# Patient Record
Sex: FEMALE | Race: OTHER | URBAN - METROPOLITAN AREA
[De-identification: names, ages, dates, MRNs, and addresses within clinical notes are randomized per-mention and may not be internally consistent; named-entity substitution may affect disease eponyms.]

---

## 2017-07-31 ENCOUNTER — EMERGENCY (EMERGENCY)
Facility: HOSPITAL | Age: 42
LOS: 1 days | Discharge: HOME | End: 2017-07-31
Attending: EMERGENCY MEDICINE | Admitting: EMERGENCY MEDICINE
Payer: COMMERCIAL

## 2017-07-31 VITALS
RESPIRATION RATE: 14 BRPM | SYSTOLIC BLOOD PRESSURE: 163 MMHG | OXYGEN SATURATION: 100 % | HEART RATE: 90 BPM | DIASTOLIC BLOOD PRESSURE: 110 MMHG

## 2017-07-31 VITALS
OXYGEN SATURATION: 100 % | RESPIRATION RATE: 16 BRPM | TEMPERATURE: 98 F | DIASTOLIC BLOOD PRESSURE: 106 MMHG | HEART RATE: 111 BPM | SYSTOLIC BLOOD PRESSURE: 159 MMHG

## 2017-07-31 DIAGNOSIS — R20.2 PARESTHESIA OF SKIN: ICD-10-CM

## 2017-07-31 LAB
ALBUMIN SERPL ELPH-MCNC: 4.6 G/DL — SIGNIFICANT CHANGE UP (ref 3.3–5)
ALP SERPL-CCNC: 95 U/L — SIGNIFICANT CHANGE UP (ref 40–120)
ALT FLD-CCNC: 43 U/L — HIGH (ref 4–33)
APTT BLD: 27.3 SEC — LOW (ref 27.5–37.4)
AST SERPL-CCNC: 37 U/L — HIGH (ref 4–32)
BASE EXCESS BLDV CALC-SCNC: 0.7 MMOL/L — SIGNIFICANT CHANGE UP
BASOPHILS # BLD AUTO: 0.04 K/UL — SIGNIFICANT CHANGE UP (ref 0–0.2)
BASOPHILS NFR BLD AUTO: 0.4 % — SIGNIFICANT CHANGE UP (ref 0–2)
BILIRUB SERPL-MCNC: 0.3 MG/DL — SIGNIFICANT CHANGE UP (ref 0.2–1.2)
BLOOD GAS VENOUS - CREATININE: 0.64 MG/DL — SIGNIFICANT CHANGE UP (ref 0.5–1.3)
BUN SERPL-MCNC: 9 MG/DL — SIGNIFICANT CHANGE UP (ref 7–23)
CALCIUM SERPL-MCNC: 9.6 MG/DL — SIGNIFICANT CHANGE UP (ref 8.4–10.5)
CHLORIDE BLDV-SCNC: 106 MMOL/L — SIGNIFICANT CHANGE UP (ref 96–108)
CHLORIDE SERPL-SCNC: 103 MMOL/L — SIGNIFICANT CHANGE UP (ref 98–107)
CO2 SERPL-SCNC: 21 MMOL/L — LOW (ref 22–31)
CREAT SERPL-MCNC: 0.6 MG/DL — SIGNIFICANT CHANGE UP (ref 0.5–1.3)
EOSINOPHIL # BLD AUTO: 0.19 K/UL — SIGNIFICANT CHANGE UP (ref 0–0.5)
EOSINOPHIL NFR BLD AUTO: 1.9 % — SIGNIFICANT CHANGE UP (ref 0–6)
GAS PNL BLDV: 135 MMOL/L — LOW (ref 136–146)
GLUCOSE BLDV-MCNC: 104 — HIGH (ref 70–99)
GLUCOSE SERPL-MCNC: 106 MG/DL — HIGH (ref 70–99)
HCO3 BLDV-SCNC: 25 MMOL/L — SIGNIFICANT CHANGE UP (ref 20–27)
HCT VFR BLD CALC: 38.8 % — SIGNIFICANT CHANGE UP (ref 34.5–45)
HCT VFR BLDV CALC: 40.8 % — SIGNIFICANT CHANGE UP (ref 34.5–45)
HGB BLD-MCNC: 13.3 G/DL — SIGNIFICANT CHANGE UP (ref 11.5–15.5)
HGB BLDV-MCNC: 13.3 G/DL — SIGNIFICANT CHANGE UP (ref 11.5–15.5)
IMM GRANULOCYTES # BLD AUTO: 0.07 # — SIGNIFICANT CHANGE UP
IMM GRANULOCYTES NFR BLD AUTO: 0.7 % — SIGNIFICANT CHANGE UP (ref 0–1.5)
INR BLD: 0.97 — SIGNIFICANT CHANGE UP (ref 0.88–1.17)
LACTATE BLDV-MCNC: 3.5 MMOL/L — HIGH (ref 0.5–2)
LYMPHOCYTES # BLD AUTO: 2.57 K/UL — SIGNIFICANT CHANGE UP (ref 1–3.3)
LYMPHOCYTES # BLD AUTO: 26.4 % — SIGNIFICANT CHANGE UP (ref 13–44)
MCHC RBC-ENTMCNC: 29 PG — SIGNIFICANT CHANGE UP (ref 27–34)
MCHC RBC-ENTMCNC: 34.3 % — SIGNIFICANT CHANGE UP (ref 32–36)
MCV RBC AUTO: 84.7 FL — SIGNIFICANT CHANGE UP (ref 80–100)
MONOCYTES # BLD AUTO: 0.75 K/UL — SIGNIFICANT CHANGE UP (ref 0–0.9)
MONOCYTES NFR BLD AUTO: 7.7 % — SIGNIFICANT CHANGE UP (ref 2–14)
NEUTROPHILS # BLD AUTO: 6.13 K/UL — SIGNIFICANT CHANGE UP (ref 1.8–7.4)
NEUTROPHILS NFR BLD AUTO: 62.9 % — SIGNIFICANT CHANGE UP (ref 43–77)
NRBC # FLD: 0 — SIGNIFICANT CHANGE UP
PCO2 BLDV: 33 MMHG — LOW (ref 41–51)
PH BLDV: 7.48 PH — HIGH (ref 7.32–7.43)
PLATELET # BLD AUTO: 320 K/UL — SIGNIFICANT CHANGE UP (ref 150–400)
PMV BLD: 10.2 FL — SIGNIFICANT CHANGE UP (ref 7–13)
PO2 BLDV: 25 MMHG — LOW (ref 35–40)
POTASSIUM BLDV-SCNC: 3.8 MMOL/L — SIGNIFICANT CHANGE UP (ref 3.4–4.5)
POTASSIUM SERPL-MCNC: 4 MMOL/L — SIGNIFICANT CHANGE UP (ref 3.5–5.3)
POTASSIUM SERPL-SCNC: 4 MMOL/L — SIGNIFICANT CHANGE UP (ref 3.5–5.3)
PROT SERPL-MCNC: 7.2 G/DL — SIGNIFICANT CHANGE UP (ref 6–8.3)
PROTHROM AB SERPL-ACNC: 10.9 SEC — SIGNIFICANT CHANGE UP (ref 9.8–13.1)
RBC # BLD: 4.58 M/UL — SIGNIFICANT CHANGE UP (ref 3.8–5.2)
RBC # FLD: 12.8 % — SIGNIFICANT CHANGE UP (ref 10.3–14.5)
SAO2 % BLDV: 45.9 % — LOW (ref 60–85)
SODIUM SERPL-SCNC: 143 MMOL/L — SIGNIFICANT CHANGE UP (ref 135–145)
TSH SERPL-MCNC: 0.66 UIU/ML — SIGNIFICANT CHANGE UP (ref 0.27–4.2)
WBC # BLD: 9.75 K/UL — SIGNIFICANT CHANGE UP (ref 3.8–10.5)
WBC # FLD AUTO: 9.75 K/UL — SIGNIFICANT CHANGE UP (ref 3.8–10.5)

## 2017-07-31 PROCEDURE — 70450 CT HEAD/BRAIN W/O DYE: CPT | Mod: 26

## 2017-07-31 PROCEDURE — 99285 EMERGENCY DEPT VISIT HI MDM: CPT

## 2017-07-31 RX ORDER — SODIUM CHLORIDE 9 MG/ML
1000 INJECTION INTRAMUSCULAR; INTRAVENOUS; SUBCUTANEOUS ONCE
Refills: 0 | Status: COMPLETED | OUTPATIENT
Start: 2017-07-31 | End: 2017-07-31

## 2017-07-31 RX ADMIN — SODIUM CHLORIDE 1000 MILLILITER(S): 9 INJECTION INTRAMUSCULAR; INTRAVENOUS; SUBCUTANEOUS at 22:43

## 2017-07-31 NOTE — ED PROVIDER NOTE - PROGRESS NOTE DETAILS
43 y/o F p/w RUE weakness, sudden onset 1 hour ago. Pt also c/o generalized weakness, malaise. Pt c/o elevated BP over the last week. Pt denies numbness, vision changes, dizziness. FSG in triage wnl. Exam shows slightly decreased strength in RUE, sensation intact, no pronator drift. Stroke Code called.  Kevon Acosta MD PGY-4 Pt ambulates w/o difficulty. No nausea, vomiting.

## 2017-07-31 NOTE — ED ADULT TRIAGE NOTE - CHIEF COMPLAINT QUOTE
Pt arrives w/ c/o feeling lightheaded, dizzy and faint while eating dinner this evening. Pt rpts feeling as though it was difficult to move right arm to place food in mouth. Equal hand grasp noted. Rpts feeling tingling, numbness and weakness to right arm at present. Symptoms started 1 hr ago. LAURYN called for stroke eval and code stroke called.

## 2017-07-31 NOTE — ED ADULT NURSE NOTE - OBJECTIVE STATEMENT
javan RN : Pt. received in room 17 , A&Ox3 arrives as a code stroke. As per pt. and  at bedside pt. had an episode of fatigue ,right sided  weakness , bilateral arm numbness an hour and a half ago. Pt. states she was outdoors for about 3 hours prior to episode. Pt. upon arrival is able to move bilateral arms and legs. Pt. just states she feels sob and weak. Pt. is placed on 2 L of oxygen for comfort requested by pt. Pt. Vitals as noted, and in no acute distress. Pt. dwgb2ep on cardiac monitor , NS. javan RN : Pt. received in room 17 , A&Ox3 arrives as a code stroke. As per pt. and  at bedside pt. had an episode of fatigue ,right sided  weakness , bilateral arm numbness an hour and a half ago. Pt. states she was outdoors for about 3 hours prior to episode. Pt. upon arrival is able to move bilateral arms and legs. Pt. just states she feels sob and weak. Pt. is placed on 2 L of oxygen for comfort requested by pt. Pt. Vitals as noted, and in no acute distress. Pt. placed on cardiac monitor , NS.

## 2017-07-31 NOTE — CONSULT NOTE ADULT - ASSESSMENT
41 y/o woman w/ PMH right arm weakness/numbness from reported shoulder/neck injury p/w lightheadedness, shortness of breath, fatigue, with increased right arm weakness x 1 hour, improving with rest. Only focal abnormality on exam is baseline decreased sensation on right upper extremity with no focal weakness. CTH with no acute pathology. Given sequence of events, symptoms most likely 2/2 exacerbation of chronic right arm symptoms in the setting of dehydration/poor PO intake, unlikely to be ischemic event.

## 2017-07-31 NOTE — CONSULT NOTE ADULT - SUBJECTIVE AND OBJECTIVE BOX
Neurology Consult Note    Name  NAIDA FOSS    HPI: 41 y/o woman w/ PMH HLD and hypothyroidism p/w fatigue, dizziness, and right arm weakness x 1.5 hours. 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 Just as she sat down to eat dinner, she felt like she was going to black out, had a spinning sensation, and felt like her right arm (which she was holding her fork with) became very heavy. Also endorses stomach feeling tight and shortness of breath. Says she has been feeling very fatigued all day. Patient has a history of right arm numbness/paresthesia and weakness from a neck/shoulder injury at work but felt this was worse than baseline. After laying down on the stretcher in the ER, patient reported improving symptoms.    NIHSS 1 MRS 0     Review of Systems:  Constitutional: As above       Eyes, Ears, Mouth, Throat: Negative  Respiratory: As above                           Cardiovascular: As above  Gastrointestinal: As above                                    Genitourinary: Negative  Musculoskeletal: As above                                   Dermatologic:  Negative  Neurological: as per above                                                                 Psychiatric:  Negative  Endocrine:  Negative             Hematologic/Lymphatic:  Negative    PMH: As above  PSH: Breast implants in 2/2016, abdominoplasty  Meds: Levothyroxine 150 mcg daily  All: NKDA  SH: Social EtOH, denies cigarette/drug use  FH: NC      Objective:   Vital Signs Last 24 Hrs  T(C): 36.9 (31 Jul 2017 20:45), Max: 36.9 (31 Jul 2017 20:45)  T(F): 98.4 (31 Jul 2017 20:45), Max: 98.4 (31 Jul 2017 20:45)  HR: 111 (31 Jul 2017 20:45) (111 - 111)  BP: 159/106 (31 Jul 2017 20:45) (159/106 - 159/106)  BP(mean): --  RR: 16 (31 Jul 2017 20:45) (16 - 16)  SpO2: 100% (31 Jul 2017 20:45) (100% - 100%)    General Exam:   General appearance: No acute distress                 Cardiovascular: S1S2, RRR  Chest: CTA b/l  Abd: Soft, nontender  Ext: No edema    Neurological Exam:  Mental Status: AAOx3, speech fluent, follows complex commands    Cranial Nerves: PERRL, EOMI, VFF, CN V1-3 intact to light touch.  No facial asymmetry. Tongue, uvula and palate midline. No dysarthria.    Motor: No pronator drift b/l UEs, no drift of b/l LEs. B/l UEs against resistance: 5/5 elbow flexion and extension. Shoulder abduction 4+/5 b/l.    Sensation: Decreased to LT inferior portion of RUE (chronic), no extinction on double simultaneous stimulation    Coord: No dysmetria or ataxia on FTN b/l    Gait: Normal, non-ataxic    Other:    Labs pending    CTH:     IMPRESSION:     No acute intracranial hemorrhage or mass effect.

## 2017-07-31 NOTE — ED PROVIDER NOTE - OBJECTIVE STATEMENT
42F hx of hypothyroidism presents with weakness in her R arm. Pt endorsed to drinking 5 shots of hard liquor every other day, been out in the sun a lot for the past few days experienced weakness and nausea around 2 hours ago. Pt denies any slurred speech, change in vision, nausea vomiting fever or chills. Also denies any focal weakness, abnormal gait. No headache.

## 2017-07-31 NOTE — ED PROVIDER NOTE - ATTENDING CONTRIBUTION TO CARE
Attending note:   After face to face evaluation of this patient, I concur with above noted hx, pe, and care plan for this patient. +43 y/o F with chronic left arm dysesthesias undergoing PT with onset tonight of lightheadedness and increased dysesthesias after being in the sun for three hours.   No weakness or sensory loss on evaluation with neuro.   Evaluation in progress; TPA not indicated at this time.

## 2017-07-31 NOTE — CONSULT NOTE ADULT - PROBLEM SELECTOR RECOMMENDATION 9
- As symptoms rapidly returned to baseline with rest, patient is not a tPA candidate  - Hydration via IV or PO  - F/u labs   - No further neurologic workup

## 2019-02-28 ENCOUNTER — RESULT REVIEW (OUTPATIENT)
Age: 44
End: 2019-02-28

## 2021-09-27 ENCOUNTER — APPOINTMENT (OUTPATIENT)
Dept: OTOLARYNGOLOGY | Facility: CLINIC | Age: 46
End: 2021-09-27
Payer: COMMERCIAL

## 2021-09-27 VITALS
BODY MASS INDEX: 33.87 KG/M2 | TEMPERATURE: 97.7 F | SYSTOLIC BLOOD PRESSURE: 170 MMHG | HEIGHT: 59 IN | HEART RATE: 82 BPM | DIASTOLIC BLOOD PRESSURE: 98 MMHG | WEIGHT: 168 LBS | OXYGEN SATURATION: 96 %

## 2021-09-27 DIAGNOSIS — E03.9 HYPOTHYROIDISM, UNSPECIFIED: ICD-10-CM

## 2021-09-27 DIAGNOSIS — Z63.5 DISRUPTION OF FAMILY BY SEPARATION AND DIVORCE: ICD-10-CM

## 2021-09-27 DIAGNOSIS — Z82.3 FAMILY HISTORY OF STROKE: ICD-10-CM

## 2021-09-27 DIAGNOSIS — F17.200 NICOTINE DEPENDENCE, UNSPECIFIED, UNCOMPLICATED: ICD-10-CM

## 2021-09-27 DIAGNOSIS — J30.2 OTHER SEASONAL ALLERGIC RHINITIS: ICD-10-CM

## 2021-09-27 DIAGNOSIS — Z78.9 OTHER SPECIFIED HEALTH STATUS: ICD-10-CM

## 2021-09-27 PROBLEM — Z00.00 ENCOUNTER FOR PREVENTIVE HEALTH EXAMINATION: Status: ACTIVE | Noted: 2021-09-27

## 2021-09-27 PROCEDURE — 99244 OFF/OP CNSLTJ NEW/EST MOD 40: CPT | Mod: 25

## 2021-09-27 PROCEDURE — 31231 NASAL ENDOSCOPY DX: CPT

## 2021-09-27 RX ORDER — ERGOCALCIFEROL 1.25 MG/1
1.25 MG CAPSULE, LIQUID FILLED ORAL
Qty: 8 | Refills: 0 | Status: ACTIVE | COMMUNITY
Start: 2021-09-02

## 2021-09-27 SDOH — SOCIAL STABILITY - SOCIAL INSECURITY: DISRUPTION OF FAMILY BY SEPARATION AND DIVORCE: Z63.5

## 2021-09-27 NOTE — REVIEW OF SYSTEMS
[Sneezing] : sneezing [Seasonal Allergies] : seasonal allergies [Ear Itch] : ear itch [Recurrent Ear Infections] : recurrent ear infections [Ear Drainage] : ear drainage [Nasal Congestion] : nasal congestion [Recurrent Sinus Infections] : recurrent sinus infections [Sinus Pain] : sinus pain [Sinus Pressure] : sinus pressure [Throat Dryness] : throat dryness [Throat Itching] : throat itching [Chills] : chills [Feeling Tired] : feeling tired [Ear Pain] : ear pain [Dizziness] : dizziness [Vertigo] : vertigo [Lightheadedness] : lightheadedness [Negative] : Heme/Lymph [Nose Bleeds] : no nose bleeds

## 2021-09-27 NOTE — PHYSICAL EXAM
[Nasal Endoscopy Performed] : nasal endoscopy was performed, see procedure section for findings [de-identified] : Pale and [id number] [Midline] : trachea located in midline position [Normal] : no rashes

## 2021-09-27 NOTE — CONSULT LETTER
[Dear  ___] : Dear  [unfilled], [Consult Letter:] : I had the pleasure of evaluating your patient, [unfilled]. [Please see my note below.] : Please see my note below. [Consult Closing:] : Thank you very much for allowing me to participate in the care of this patient.  If you have any questions, please do not hesitate to contact me. [Sincerely,] : Sincerely, [FreeTextEntry2] : Loc Hansen MD ([place], [place])\par  [FreeTextEntry3] : Chad Hicks MD, FACS\par Chief of Otolaryngology [facility]\par  - Dept. of Otolaryngology\par [employer]\par \par

## 2021-09-27 NOTE — HISTORY OF PRESENT ILLNESS
[Facial Pain] : facial pain [Headache] : headache [Clear Rhinorrhea] : clear rhinorrhea [Facial Pressure] : facial pressure [Retro-Orbital Pain] : retro-orbital pain [Purulent Rhinorrhea] : purulent rhinorrhea [Nasal Congestion] : nasal congestion [Postnasal Drainage] : postnasal drainage [de-identified] : Ms. FOSS is a 46 year female who presents reporting that she constantly has sinus pressure, severe dryness and the sensation that fluid moves to her ears.  She also notes an itch on her eyes.  When she has the congestion, her eyes hurt with corresponding head head ache.  She reports that eventually the congestion travels down to her chest.  No imaging was done.  She reports going to the urgent centers when she is symptomatic of which she was given antibiotics with relief.

## 2021-09-27 NOTE — CONSULT LETTER
[Dear  ___] : Dear  [unfilled], [Consult Letter:] : I had the pleasure of evaluating your patient, [unfilled]. [Please see my note below.] : Please see my note below. [Consult Closing:] : Thank you very much for allowing me to participate in the care of this patient.  If you have any questions, please do not hesitate to contact me. [Sincerely,] : Sincerely, [FreeTextEntry2] : Loc Hansen MD ([place], [place])\par  [FreeTextEntry3] : Chad Hicks MD, FACS\par Chief of Otolaryngology [facility]\par  - Dept. of Otolaryngology\par [facility] [employer]\par \par

## 2021-09-27 NOTE — PHYSICAL EXAM
[Nasal Endoscopy Performed] : nasal endoscopy was performed, see procedure section for findings [de-identified] : Pale and [id number] [Midline] : trachea located in midline position [Normal] : no rashes

## 2021-09-27 NOTE — REASON FOR VISIT
[Initial Consultation] : an initial consultation for [FreeTextEntry2] : sinus pressure and congestion.

## 2021-09-27 NOTE — PROCEDURE
[FreeTextEntry6] : Procedure performed: Nasal Endoscopy- Diagnostic\par \par Pre-op indication(s): Chronic nasal inflammation\par Post-op indication(s): Same\par \par Verbal and/or written consent obtained from patient\par \par “Anterior rhinoscopy insufficient to account for symptoms”\par \par Details for procedure:\par Scope #: G1\par Type of scope: Flex\par \par Anesthesia: 4% Lidocaine and Afrin\par \par The following anatomic sites were directly examined in a sequential fashion:\par The scope was introduced in the nasal passage between the middle and inferior turbinates to exam the inferior portion of the middle meatus and the fontanelle, as well as the maxillary ostia.  Next, the scope was passed medially and posteriorly to the middle turbinates to examine the sphenoethmoid recess and the superior turbinate region.\par \par Upon visualization the findings are as follows:\par \par Nasal Septum:\par            Normal          \par             \par Right Side:\par ·	Mucosa: Edematous\par ·	Mucous: Normal\par ·	Polyp: No\par ·	Inferior Turbinate: Normal\par ·	Middle Turbinate: Normal\par ·	Superior Turbinate: Normal\par ·	Inferior Meatus: Normal\par ·	Middle Meatus: Normal\par ·	Superior Meatus: Normal\par ·	Sphenoethmoidal Recess: Normal\par \par Left Side:\par ·	Mucosa: Edematous\par ·	Mucous: Normal\par ·	Polyp: No\par ·	Inferior Turbinate: Normal\par ·	Middle Turbinate: Normal\par ·	Superior Turbinate: Normal\par ·	Inferior Meatus: Normal\par ·	Middle Meatus: Normal\par ·	Superior Meatus: Normal\par ·	Sphenoethmoidal Recess: Normal\par \par \par The patient tolerated the procedure well without any complications. \par \par

## 2021-09-27 NOTE — HISTORY OF PRESENT ILLNESS
[Facial Pain] : facial pain [Headache] : headache [Clear Rhinorrhea] : clear rhinorrhea [Facial Pressure] : facial pressure [Retro-Orbital Pain] : retro-orbital pain [Purulent Rhinorrhea] : purulent rhinorrhea [Nasal Congestion] : nasal congestion [Postnasal Drainage] : postnasal drainage [de-identified] : Ms. FOSS is a 46 year female who presents reporting that she constantly has sinus pressure, severe dryness and the sensation that fluid moves to her ears.  She also notes an itch on her eyes.  When she has the congestion, her eyes hurt with corresponding head head ache.  She reports that eventually the congestion travels down to her chest.  No imaging was done.  She reports going to the urgent centers when she is symptomatic of which she was given antibiotics with relief.

## 2021-10-13 ENCOUNTER — RESULT REVIEW (OUTPATIENT)
Age: 46
End: 2021-10-13

## 2022-04-25 ENCOUNTER — FORM ENCOUNTER (OUTPATIENT)
Age: 47
End: 2022-04-25

## 2022-04-25 ENCOUNTER — APPOINTMENT (OUTPATIENT)
Dept: ORTHOPEDIC SURGERY | Facility: CLINIC | Age: 47
End: 2022-04-25
Payer: OTHER MISCELLANEOUS

## 2022-04-25 VITALS — WEIGHT: 168 LBS | BODY MASS INDEX: 33.87 KG/M2 | HEIGHT: 59 IN

## 2022-04-25 DIAGNOSIS — Z78.9 OTHER SPECIFIED HEALTH STATUS: ICD-10-CM

## 2022-04-25 DIAGNOSIS — Z86.39 PERSONAL HISTORY OF OTHER ENDOCRINE, NUTRITIONAL AND METABOLIC DISEASE: ICD-10-CM

## 2022-04-25 PROCEDURE — 99214 OFFICE O/P EST MOD 30 MIN: CPT

## 2022-04-25 PROCEDURE — 99072 ADDL SUPL MATRL&STAF TM PHE: CPT

## 2022-04-25 NOTE — DISCUSSION/SUMMARY
[de-identified] : The patient was advised of the diagnosis.  The natural history of the pathology was explained in full to the patient in layman's terms.  Several different treatment options were discussed and explained to the patient including the risks and benefits of both surgical and non-surgical treatments.\par \par The risks, benefits, and alternatives to surgical arthroscopy of the left knee with partial medial meniscectomy and synovectomy were reviewed with the patient.  Specifically, I reviewed with the patient that any anterior knee pain may paradoxically worsen for the first six weeks following arthroscopy due to quadriceps weakness. Further, I reviewed with the patient that while arthroscopic treatment typically provides substantial relief of the symptoms (posteromedial or posterolateral joint line pain and mechanical symptoms) related to meniscus tears, arthroscopic treatments typically have very minimal relief of symptoms (anterior knee pain) related to chondromalacia patella or early osteoarthritis.  The risk of recurrent tears as well as progression of occult or underlying arthritis, avascular necrosis, and / or chondrolysis were discussed as well. The patient clearly communicated that these issues were understood.  \par \par We also discussed that the risks of surgery include but are not limited to pain, infection (superficial or deep), bleeding, vascular injury, numbness, tingling, nerve damage (direct or indirect), scarring, wound breakdown, failure to resolve symptoms, symptom recurrence, the need for further surgery as well as medical complications such as blood clots, pulmonary embolism, heart attack, stroke, and other anesthesia complications including even death.  The patient clearly communicated that these risks were understood and wished to proceed. This will be scheduled accordingly.\par

## 2022-04-25 NOTE — HISTORY OF PRESENT ILLNESS
[de-identified] : WC 1/17/22\par  \par 46 year old female   (RHD, ) left knee pain since slipped and fell in the parking lot at work on 1/17/22 and knee twisted. has pain that is medial anterior and deep and assoc with catching and buckling. has tried ice, nsaids, activity modficiaotn without relief.\par \par   3/31/22 - had CSI (2/14) , using brace, , got mri\par 4/25/22 - sent to PT but still hasn’t got to go, cont medial pain and catching\par

## 2022-04-25 NOTE — IMAGING
[de-identified] : \par LEFT KNEE\par Inspection:  mild effusion\par Palpation: medial joint line tenderness \par Knee Range of Motion:  0-130 \par Strength: 5/5 Quadriceps strength, 5/5 Hamstring strength\par Neurological: light touch is intact throughout\par Ligament Stability and Special Tests: \par McMurrays: Positive\par Lachman: neg\par Pivot Shift: neg\par Posterior Drawer: neg\par Valgus: neg\par Varus: neg\par Patella Apprehension: neg\par Patella Maltracking: neg\par

## 2022-04-25 NOTE — ASSESSMENT
[FreeTextEntry1] : mri left knee (action open mri) 3/4/22 - MMT (free edge), eff, synov\par \par since cont mechanical symptoms along with active lifestyle she is a surg candidate\par r/b/a L PMM, synov discussed at length but wants to try PT\par oow till may 13th then can reutrnn to work\par will fu 1 month and re-eval poss surgery\par

## 2022-04-25 NOTE — WORK
[Partial] : partial [Does not reveal pre-existing condition(s) that may affect treatment/prognosis] : does not reveal pre-existing condition(s) that may affect treatment/prognosis [Patient] : patient [I provided the services listed above] :  I provided the services listed above. [Can return to work without limitations on ______] : can return to work without limitations on [unfilled]

## 2022-05-23 ENCOUNTER — APPOINTMENT (OUTPATIENT)
Dept: ORTHOPEDIC SURGERY | Facility: CLINIC | Age: 47
End: 2022-05-23

## 2022-06-09 ENCOUNTER — APPOINTMENT (OUTPATIENT)
Dept: ORTHOPEDIC SURGERY | Facility: CLINIC | Age: 47
End: 2022-06-09

## 2022-06-16 ENCOUNTER — APPOINTMENT (OUTPATIENT)
Dept: ORTHOPEDIC SURGERY | Facility: CLINIC | Age: 47
End: 2022-06-16

## 2022-06-20 ENCOUNTER — APPOINTMENT (OUTPATIENT)
Dept: ORTHOPEDIC SURGERY | Facility: CLINIC | Age: 47
End: 2022-06-20
Payer: OTHER MISCELLANEOUS

## 2022-06-20 VITALS — HEIGHT: 58 IN | WEIGHT: 165 LBS | BODY MASS INDEX: 34.63 KG/M2

## 2022-06-20 PROCEDURE — 99215 OFFICE O/P EST HI 40 MIN: CPT

## 2022-06-20 PROCEDURE — 99072 ADDL SUPL MATRL&STAF TM PHE: CPT

## 2022-06-20 NOTE — DISCUSSION/SUMMARY
[de-identified] : The patient was advised of the diagnosis.  The natural history of the pathology was explained in full to the patient in layman's terms.  Several different treatment options were discussed and explained to the patient including the risks and benefits of both surgical and non-surgical treatments.\par \par The risks, benefits, and alternatives to surgical arthroscopy of the left knee with partial medial meniscectomy and synovectomy were reviewed with the patient.  Specifically, I reviewed with the patient that any anterior knee pain may paradoxically worsen for the first six weeks following arthroscopy due to quadriceps weakness. Further, I reviewed with the patient that while arthroscopic treatment typically provides substantial relief of the symptoms (posteromedial or posterolateral joint line pain and mechanical symptoms) related to meniscus tears, arthroscopic treatments typically have very minimal relief of symptoms (anterior knee pain) related to chondromalacia patella or early osteoarthritis.  The risk of recurrent tears as well as progression of occult or underlying arthritis, avascular necrosis, and / or chondrolysis were discussed as well. The patient clearly communicated that these issues were understood.  \par \par We discussed the additional risk and side effects associated with obesity - including but not limited to the negative impact on healing, infection rate, potential failure of surgical implants, and negative patient outcomes.  The patient was counseled on weight loss.\par \par We also discussed that the risks of surgery include but are not limited to pain, infection (superficial or deep), bleeding, vascular injury, numbness, tingling, nerve damage (direct or indirect), scarring, wound breakdown, failure to resolve symptoms, symptom recurrence, the need for further surgery as well as medical complications such as blood clots, pulmonary embolism, heart attack, stroke, and other anesthesia complications including even death.  The patient clearly communicated that these risks were understood.\par

## 2022-06-20 NOTE — IMAGING
[de-identified] : \par LEFT KNEE\par Inspection:  mild effusion\par Palpation: medial joint line tenderness \par Knee Range of Motion:  0-130 \par Strength: 5/5 Quadriceps strength, 5/5 Hamstring strength\par Neurological: light touch is intact throughout\par Ligament Stability and Special Tests: \par McMurrays: Positive\par Lachman: neg\par Pivot Shift: neg\par Posterior Drawer: neg\par Valgus: neg\par Varus: neg\par Patella Apprehension: neg\par Patella Maltracking: neg\par

## 2022-06-20 NOTE — ASSESSMENT
[FreeTextEntry1] : mri left knee (action open mri) 3/4/22 - MMT (free edge), eff, synov\par \par \par - The patient was advised of the diagnosis.  The natural history of the pathology was explained to the patient in layman's terms.  Several different treatment options were discussed and explained including the risks and benefits of both surgical and non-surgical treatments.  All questions and concerns were answered.\par - since cont mechanical symptoms despite conser treatment along with active lifestyle she is a surg candidate\par - r/b/a L PMM, synov discussed at length (incr risk due to bmi> 30), but she wants to hold off and try more PT\par - The patient was provided with a prescription for Physical Therapy.\par = naproyn rx\par - Patient was given a prescription for an anti-inflammatory medication.  They will take it for the next week and then on an as needed basis, as long as there are no medical contra-indications.  Patient is counseled on possible GI, renal, and cardiovascular side effects.\par - can only work light duty\par \par \par

## 2022-06-20 NOTE — HISTORY OF PRESENT ILLNESS
[de-identified] : WC 1/17/22\par  \par 46 year old female   (RHD, ) left knee pain since slipped and fell in the parking lot at work on 1/17/22 and knee twisted. has pain that is medial anterior and deep and assoc with catching and buckling. has tried ice, nsaids, activity modficiaotn without relief.\par \par   3/31/22 - had CSI (2/14) , using brace, , got mri\par 4/25/22 - sent to PT but still hasn’t got to go, cont medial pain and catching\par 6/20/22 - cont to have pain and mechanical symptoms with activty despite PT/HEP\par

## 2022-08-03 PROBLEM — M65.9 SYNOVITIS OF KNEE: Status: ACTIVE | Noted: 2022-05-20

## 2022-08-03 PROBLEM — S83.232A COMPLEX TEAR OF MEDIAL MENISCUS OF LEFT KNEE AS CURRENT INJURY, INITIAL ENCOUNTER: Status: ACTIVE | Noted: 2022-04-25

## 2022-08-04 ENCOUNTER — APPOINTMENT (OUTPATIENT)
Dept: ORTHOPEDIC SURGERY | Facility: CLINIC | Age: 47
End: 2022-08-04

## 2022-08-04 DIAGNOSIS — S83.232A COMPLEX TEAR OF MEDIAL MENISCUS, CURRENT INJURY, LEFT KNEE, INITIAL ENCOUNTER: ICD-10-CM

## 2022-08-04 DIAGNOSIS — M65.9 SYNOVITIS AND TENOSYNOVITIS, UNSPECIFIED: ICD-10-CM

## 2023-06-01 ENCOUNTER — APPOINTMENT (OUTPATIENT)
Dept: GASTROENTEROLOGY | Facility: CLINIC | Age: 48
End: 2023-06-01
Payer: COMMERCIAL

## 2023-06-01 VITALS
WEIGHT: 173 LBS | DIASTOLIC BLOOD PRESSURE: 111 MMHG | HEART RATE: 87 BPM | BODY MASS INDEX: 36.31 KG/M2 | HEIGHT: 58 IN | SYSTOLIC BLOOD PRESSURE: 150 MMHG

## 2023-06-01 DIAGNOSIS — Z86.018 PERSONAL HISTORY OF OTHER BENIGN NEOPLASM: ICD-10-CM

## 2023-06-01 DIAGNOSIS — Z80.0 FAMILY HISTORY OF MALIGNANT NEOPLASM OF DIGESTIVE ORGANS: ICD-10-CM

## 2023-06-01 DIAGNOSIS — T54.91XA TOXIC EFFECT OF UNSPECIFIED CORROSIVE SUBSTANCE, ACCIDENTAL (UNINTENTIONAL), INITIAL ENCOUNTER: ICD-10-CM

## 2023-06-01 PROCEDURE — 99204 OFFICE O/P NEW MOD 45 MIN: CPT

## 2023-06-01 RX ORDER — NAPROXEN 500 MG/1
500 TABLET ORAL TWICE DAILY
Qty: 60 | Refills: 0 | Status: DISCONTINUED | COMMUNITY
Start: 2022-06-20 | End: 2023-06-01

## 2023-06-02 PROBLEM — T54.91XA INGESTION OF CAUSTIC SUBSTANCE: Status: RESOLVED | Noted: 2023-06-02 | Resolved: 2023-06-02

## 2023-06-02 PROBLEM — Z80.0 FAMILY HISTORY OF MALIGNANT NEOPLASM OF COLON: Status: ACTIVE | Noted: 2021-09-27

## 2023-06-02 RX ORDER — LEVOTHYROXINE SODIUM 112 UG/1
112 TABLET ORAL
Refills: 0 | Status: ACTIVE | COMMUNITY
Start: 2021-08-21

## 2023-06-02 RX ORDER — POLYETHYLENE GLYCOL 3350 AND ELECTROLYTES WITH LEMON FLAVOR 236; 22.74; 6.74; 5.86; 2.97 G/4L; G/4L; G/4L; G/4L; G/4L
236 POWDER, FOR SOLUTION ORAL
Qty: 1 | Refills: 0 | Status: ACTIVE | COMMUNITY
Start: 2023-06-02 | End: 1900-01-01

## 2023-06-02 NOTE — REASON FOR VISIT
[Consultation] : a consultation visit [FreeTextEntry1] : colon cancer screening, chronic constipation

## 2023-06-02 NOTE — ASSESSMENT
[FreeTextEntry1] : 1.  Change in bowel habits with constipation, thin caliber stool, incomplete evacuation.  May be due to thyroid disorder, IBS-C.  Rule out obstructive lesions.\par 2.  Right-sided abdominal pain with radiation to back.  May be due to radiculopathy, constipation, fibroids.\par 3.  Family history of colon cancer (maternal uncle).  Prior colonoscopy 8 years ago.\par 4.  History of caustic ingestion with nausea/ vomiting, burning pain.  Rule out stricture or pre malignant lesions.\par 5.  Hypothyroidism.\par \par Recs:\par - Recent labs reviewed on phone.\par - The patient was advised to increase fluid and dietary fiber intake.  In addition, the patient was advised to use a fiber supplement daily.  She was given names of OTC products to try for constipation.\par - Samples of Linzess 72 mcg and 145 mcg given today.\par - Patient was advised to undergo colonoscopy and endoscopy- procedure, risks, benefits, and alternatives were explained. Patient agreeable. Brochure given. PEG prep with extra Dulcolax.\par - Letter given for work today.

## 2023-06-02 NOTE — HISTORY OF PRESENT ILLNESS
[FreeTextEntry1] : Adriana presents to the office today for evaluation for colon cancer screening and chronic constipation.\par \par The patient reports intermittent, chronic constipation for the past 2 years.  Prior to that, she reports regular daily BMs.  She reports that her bowel habits changed after she used an antibiotic for a dental infection.  The constipation also worsened after she had COVID.  She may move her bowels daily but the stool can be thin in caliber or very hard.  She may see red blood when she wipes herself after a hard stool.  Other times, she can go a few days without a BM.  She also does not feel completely evacuated after a BM.  She feels right-sided abdominal pain which radiates to her back and rectum.  The pain improves when she moves her bowels adequately or when she urinates. She has had sciatica in the past but feels her current pain is due to constipation and not her back.  She works as a  at night so her change in bowel habits is affecting her life.  The patient has tried Miralax but reports that it did not have any effect.\par \par Her mother had colon polyps and diverticulitis and her maternal uncle had colon cancer.  The patient had a colonoscopy about 8 years ago which was unremarkable.\par \par Of note, the patient was also advised by her PCP to undergo an EGD for a caustic ingestion several years ago.  While she was at work, she drank from a gingerale bottle that had been filled with a cleaning solution.  At the time, she had burning in her throat and chest with nausea and vomiting.  She was seen in the ER at that time but never had an EGD performed.  She currently denies any dysphagia or nausea.

## 2023-07-05 ENCOUNTER — EMERGENCY (EMERGENCY)
Facility: HOSPITAL | Age: 48
LOS: 1 days | Discharge: HOME | End: 2023-07-05
Attending: STUDENT IN AN ORGANIZED HEALTH CARE EDUCATION/TRAINING PROGRAM | Admitting: STUDENT IN AN ORGANIZED HEALTH CARE EDUCATION/TRAINING PROGRAM
Payer: COMMERCIAL

## 2023-07-05 VITALS
HEART RATE: 110 BPM | RESPIRATION RATE: 18 BRPM | TEMPERATURE: 99 F | SYSTOLIC BLOOD PRESSURE: 201 MMHG | OXYGEN SATURATION: 100 % | DIASTOLIC BLOOD PRESSURE: 115 MMHG

## 2023-07-05 LAB
ALBUMIN SERPL ELPH-MCNC: 4.5 G/DL — SIGNIFICANT CHANGE UP (ref 3.3–5)
ALP SERPL-CCNC: 98 U/L — SIGNIFICANT CHANGE UP (ref 40–120)
ALT FLD-CCNC: 45 U/L — HIGH (ref 4–33)
ANION GAP SERPL CALC-SCNC: 17 MMOL/L — HIGH (ref 7–14)
AST SERPL-CCNC: 37 U/L — HIGH (ref 4–32)
BASE EXCESS BLDV CALC-SCNC: -2.2 MMOL/L — LOW (ref -2–3)
BASOPHILS # BLD AUTO: 0.05 K/UL — SIGNIFICANT CHANGE UP (ref 0–0.2)
BASOPHILS NFR BLD AUTO: 0.5 % — SIGNIFICANT CHANGE UP (ref 0–2)
BILIRUB SERPL-MCNC: 0.5 MG/DL — SIGNIFICANT CHANGE UP (ref 0.2–1.2)
BLOOD GAS VENOUS COMPREHENSIVE RESULT: SIGNIFICANT CHANGE UP
BUN SERPL-MCNC: 7 MG/DL — SIGNIFICANT CHANGE UP (ref 7–23)
CALCIUM SERPL-MCNC: 9.6 MG/DL — SIGNIFICANT CHANGE UP (ref 8.4–10.5)
CHLORIDE BLDV-SCNC: 106 MMOL/L — SIGNIFICANT CHANGE UP (ref 96–108)
CHLORIDE SERPL-SCNC: 106 MMOL/L — SIGNIFICANT CHANGE UP (ref 98–107)
CO2 BLDV-SCNC: 25.1 MMOL/L — SIGNIFICANT CHANGE UP (ref 22–26)
CO2 SERPL-SCNC: 19 MMOL/L — LOW (ref 22–31)
CREAT SERPL-MCNC: 0.58 MG/DL — SIGNIFICANT CHANGE UP (ref 0.5–1.3)
EGFR: 110 ML/MIN/1.73M2 — SIGNIFICANT CHANGE UP
EOSINOPHIL # BLD AUTO: 0.17 K/UL — SIGNIFICANT CHANGE UP (ref 0–0.5)
EOSINOPHIL NFR BLD AUTO: 1.7 % — SIGNIFICANT CHANGE UP (ref 0–6)
GAS PNL BLDV: 139 MMOL/L — SIGNIFICANT CHANGE UP (ref 136–145)
GAS PNL BLDV: SIGNIFICANT CHANGE UP
GLUCOSE BLDV-MCNC: 103 MG/DL — HIGH (ref 70–99)
GLUCOSE SERPL-MCNC: 105 MG/DL — HIGH (ref 70–99)
HCG SERPL-ACNC: <1 MIU/ML — SIGNIFICANT CHANGE UP
HCO3 BLDV-SCNC: 24 MMOL/L — SIGNIFICANT CHANGE UP (ref 22–29)
HCT VFR BLD CALC: 39.5 % — SIGNIFICANT CHANGE UP (ref 34.5–45)
HCT VFR BLDA CALC: 41 % — SIGNIFICANT CHANGE UP (ref 34.5–46.5)
HGB BLD CALC-MCNC: 13.5 G/DL — SIGNIFICANT CHANGE UP (ref 11.7–16.1)
HGB BLD-MCNC: 12.9 G/DL — SIGNIFICANT CHANGE UP (ref 11.5–15.5)
IANC: 6.84 K/UL — SIGNIFICANT CHANGE UP (ref 1.8–7.4)
IMM GRANULOCYTES NFR BLD AUTO: 0.4 % — SIGNIFICANT CHANGE UP (ref 0–0.9)
LACTATE BLDV-MCNC: 2.4 MMOL/L — HIGH (ref 0.5–2)
LIDOCAIN IGE QN: 35 U/L — SIGNIFICANT CHANGE UP (ref 7–60)
LYMPHOCYTES # BLD AUTO: 2.34 K/UL — SIGNIFICANT CHANGE UP (ref 1–3.3)
LYMPHOCYTES # BLD AUTO: 23.1 % — SIGNIFICANT CHANGE UP (ref 13–44)
MCHC RBC-ENTMCNC: 28.9 PG — SIGNIFICANT CHANGE UP (ref 27–34)
MCHC RBC-ENTMCNC: 32.7 GM/DL — SIGNIFICANT CHANGE UP (ref 32–36)
MCV RBC AUTO: 88.6 FL — SIGNIFICANT CHANGE UP (ref 80–100)
MONOCYTES # BLD AUTO: 0.68 K/UL — SIGNIFICANT CHANGE UP (ref 0–0.9)
MONOCYTES NFR BLD AUTO: 6.7 % — SIGNIFICANT CHANGE UP (ref 2–14)
NEUTROPHILS # BLD AUTO: 6.84 K/UL — SIGNIFICANT CHANGE UP (ref 1.8–7.4)
NEUTROPHILS NFR BLD AUTO: 67.6 % — SIGNIFICANT CHANGE UP (ref 43–77)
NRBC # BLD: 0 /100 WBCS — SIGNIFICANT CHANGE UP (ref 0–0)
NRBC # FLD: 0 K/UL — SIGNIFICANT CHANGE UP (ref 0–0)
PCO2 BLDV: 44 MMHG — SIGNIFICANT CHANGE UP (ref 39–52)
PH BLDV: 7.34 — SIGNIFICANT CHANGE UP (ref 7.32–7.43)
PLATELET # BLD AUTO: 292 K/UL — SIGNIFICANT CHANGE UP (ref 150–400)
PO2 BLDV: 48 MMHG — HIGH (ref 25–45)
POTASSIUM BLDV-SCNC: 3.6 MMOL/L — SIGNIFICANT CHANGE UP (ref 3.5–5.1)
POTASSIUM SERPL-MCNC: 4.3 MMOL/L — SIGNIFICANT CHANGE UP (ref 3.5–5.3)
POTASSIUM SERPL-SCNC: 4.3 MMOL/L — SIGNIFICANT CHANGE UP (ref 3.5–5.3)
PROT SERPL-MCNC: 6.8 G/DL — SIGNIFICANT CHANGE UP (ref 6–8.3)
RBC # BLD: 4.46 M/UL — SIGNIFICANT CHANGE UP (ref 3.8–5.2)
RBC # FLD: 12.9 % — SIGNIFICANT CHANGE UP (ref 10.3–14.5)
SAO2 % BLDV: 74.8 % — SIGNIFICANT CHANGE UP (ref 67–88)
SODIUM SERPL-SCNC: 142 MMOL/L — SIGNIFICANT CHANGE UP (ref 135–145)
TROPONIN T, HIGH SENSITIVITY RESULT: <6 NG/L — SIGNIFICANT CHANGE UP
WBC # BLD: 10.12 K/UL — SIGNIFICANT CHANGE UP (ref 3.8–10.5)
WBC # FLD AUTO: 10.12 K/UL — SIGNIFICANT CHANGE UP (ref 3.8–10.5)

## 2023-07-05 PROCEDURE — 93010 ELECTROCARDIOGRAM REPORT: CPT

## 2023-07-05 PROCEDURE — 99285 EMERGENCY DEPT VISIT HI MDM: CPT

## 2023-07-05 PROCEDURE — 76705 ECHO EXAM OF ABDOMEN: CPT | Mod: 26

## 2023-07-05 PROCEDURE — 71046 X-RAY EXAM CHEST 2 VIEWS: CPT | Mod: 26

## 2023-07-05 RX ORDER — ACETAMINOPHEN 500 MG
975 TABLET ORAL ONCE
Refills: 0 | Status: COMPLETED | OUTPATIENT
Start: 2023-07-05 | End: 2023-07-05

## 2023-07-05 RX ORDER — SODIUM CHLORIDE 9 MG/ML
1000 INJECTION INTRAMUSCULAR; INTRAVENOUS; SUBCUTANEOUS ONCE
Refills: 0 | Status: COMPLETED | OUTPATIENT
Start: 2023-07-05 | End: 2023-07-05

## 2023-07-05 RX ADMIN — Medication 975 MILLIGRAM(S): at 21:05

## 2023-07-05 RX ADMIN — SODIUM CHLORIDE 1000 MILLILITER(S): 9 INJECTION INTRAMUSCULAR; INTRAVENOUS; SUBCUTANEOUS at 21:05

## 2023-07-05 NOTE — ED ADULT NURSE NOTE - OBJECTIVE STATEMENT
patient received to room 6. Patient is a&ox4 ambulatory at baseline. Patient c/o of abdominal pain on lower right quadrant. Patient also been feeling dizzy and sob since today. Pt has a prescription for HTN. admits with being compliant with medication. Patient denies any chest pain sob n/v. Patient denies blurry vision. Patient awaiting for MD, orders and will update plan of care. Safety is maintained.

## 2023-07-05 NOTE — ED PROVIDER NOTE - PHYSICAL EXAMINATION
General appearance: NAD, conversant, afebrile    Eyes: anicteric sclerae, JG, EOMI   HENT: Atraumatic; oropharynx clear, MMM and no ulcerations, no pharyngeal erythema or exudate   Neck: Trachea midline; Full range of motion, supple   Pulm: CTA bl, normal respiratory effort and no intercostal retractions, normal work of breathing   CV: RRR, No murmurs, rubs, or gallops. 2+ peripheral pulses.   Abdomen: ttp RUQ, mildly suprapubic. Soft, non-distended; no guarding or rebound   Extremities: No peripheral edema or extremity lymphadenopathy. 5/5 strength in all four extremities.   Skin: Dry, normal temperature, turgor and texture; no rash, ulcers or subcutaneous nodules   Psych: Appropriate affect, cooperative; alert and oriented to person, place and time

## 2023-07-05 NOTE — ED PROVIDER NOTE - ATTENDING CONTRIBUTION TO CARE
(Oma Coma Scale) 4= 13-15
48-year-old female past medical history of hypertension, hypothyroidism presents with right-sided abdominal pain as well as shortness of breath secondary to the abdominal pain.  She denies cough, fever, chills, nausea, vomiting, diarrhea, constipation.  Patient denies any extremity pain or swelling, chest pain, urinary symptoms.  Exam as above  Plan: Labs, symptom relief, ultrasound/CT, reassess

## 2023-07-05 NOTE — ED PROVIDER NOTE - NSFOLLOWUPCLINICS_GEN_ALL_ED_FT
[facility]  OB/GYN & Surg  [address] [place] [id number]  Phone: [phone]  Fax:     [facility] Gynecology and Obstetrics  Gynceology/OB  [address] [place] [address]  Phone: [phone]  Fax:

## 2023-07-05 NOTE — ED ADULT TRIAGE NOTE - CHIEF COMPLAINT QUOTE
Pt states she woke up this morning feeling SOB, and pain to right side of abdomen. Denies chest pain. States she had a recent bout of bronchitis due to air quality. States her mouth feels dry and she feels dehydrated. Pt is hypertensive in triage, states she recently stopped taking her BP meds because her BP was under control. Denies dizziness or headache.

## 2023-07-05 NOTE — ED PROVIDER NOTE - CLINICAL SUMMARY MEDICAL DECISION MAKING FREE TEXT BOX
48F w/ hx htn, hypothyroid presenting with sob. Pt started having 1d of R abd pain, sob. It is spanning entire R abd, feels like burning. No fever, cp, n/v, leg swelling/pain, hx blood clot or AC use. Hx 3 c-sections. Passing gas and having normal BM. No urinary symptoms. Exam shows RUQ ttp, mild suprapubic ttp. Clear lungs. c/f keith. Will get labs, US, CT, pain meds, ivf.

## 2023-07-05 NOTE — ED PROVIDER NOTE - OBJECTIVE STATEMENT
48F w/ hx htn, hypothyroid presenting with sob. Pt started having 1d of R abd pain, sob. It is spanning entire R abd, feels like burning. No fever, cp, n/v, leg swelling/pain, hx blood clot or AC use. Hx 3 c-sections. Passing gas and having normal BM. No urinary symptoms.

## 2023-07-05 NOTE — ED PROVIDER NOTE - PATIENT PORTAL LINK FT
You can access the FollowMyHealth Patient Portal offered by [facility] by registering at the following website: [email or web address]. By joining [facility]’s FollowMyHealth portal, you will also be able to view your health information using other applications (apps) compatible with our system.

## 2023-07-05 NOTE — ED PROVIDER NOTE - PROGRESS NOTE DETAILS
Aldo Fletcher- pt feeling a lot better. discussed results and plan to f/u with ob for ovarian cyst. pt agrees with plan. return precautions given.

## 2023-07-05 NOTE — ED ADULT NURSE NOTE - DOES PATIENT HAVE ADVANCE DIRECTIVE
[facility] ED to IP Report (EDIP)    Mental Status     Alert and oriented    Safety     Fall Risk    Tele  No    Oxygen Needs  room air    Mobility    Unsteady Gait and Up with 1 Assist    Protocols  None    ISAR          Isolation  None    Additional Information:  Needs PT evaluation.    unknown

## 2023-07-06 VITALS
OXYGEN SATURATION: 100 % | HEART RATE: 87 BPM | SYSTOLIC BLOOD PRESSURE: 171 MMHG | RESPIRATION RATE: 18 BRPM | DIASTOLIC BLOOD PRESSURE: 106 MMHG | TEMPERATURE: 98 F

## 2023-07-06 LAB
APPEARANCE UR: CLEAR — SIGNIFICANT CHANGE UP
BACTERIA # UR AUTO: NEGATIVE /HPF — SIGNIFICANT CHANGE UP
BILIRUB UR-MCNC: NEGATIVE — SIGNIFICANT CHANGE UP
CAST: 0 /LPF — SIGNIFICANT CHANGE UP (ref 0–4)
COLOR SPEC: YELLOW — SIGNIFICANT CHANGE UP
DIFF PNL FLD: ABNORMAL
GLUCOSE UR QL: NEGATIVE MG/DL — SIGNIFICANT CHANGE UP
KETONES UR-MCNC: NEGATIVE MG/DL — SIGNIFICANT CHANGE UP
LEUKOCYTE ESTERASE UR-ACNC: NEGATIVE — SIGNIFICANT CHANGE UP
NITRITE UR-MCNC: NEGATIVE — SIGNIFICANT CHANGE UP
PH UR: 6.5 — SIGNIFICANT CHANGE UP (ref 5–8)
PROT UR-MCNC: NEGATIVE MG/DL — SIGNIFICANT CHANGE UP
RBC CASTS # UR COMP ASSIST: 2 /HPF — SIGNIFICANT CHANGE UP (ref 0–4)
SP GR SPEC: 1.01 — SIGNIFICANT CHANGE UP (ref 1–1.03)
SQUAMOUS # UR AUTO: 1 /HPF — SIGNIFICANT CHANGE UP (ref 0–5)
UROBILINOGEN FLD QL: 0.2 MG/DL — SIGNIFICANT CHANGE UP (ref 0.2–1)
WBC UR QL: 0 /HPF — SIGNIFICANT CHANGE UP (ref 0–5)

## 2023-07-06 PROCEDURE — 74177 CT ABD & PELVIS W/CONTRAST: CPT | Mod: 26,MA

## 2023-07-06 NOTE — ED ADULT NURSE REASSESSMENT NOTE - NSFALLUNIVINTERV_ED_ALL_ED
Bed/Stretcher in lowest position, wheels locked, appropriate side rails in place/Call bell, personal items and telephone in reach/Instruct patient to call for assistance before getting out of bed/chair/stretcher/Non-slip footwear applied when patient is off stretcher/[place] to call system/Physically safe environment - no spills, clutter or unnecessary equipment/Purposeful proactive rounding/Room/bathroom lighting operational, light cord in reach

## 2023-07-06 NOTE — ED ADULT NURSE REASSESSMENT NOTE - NS ED NURSE REASSESS COMMENT FT1
A&Ox4. Patient blood pressure elevated. Md made aware. Respirations even and unlabored. Awaiting results of CT. Safety precautions in place.

## 2023-07-09 NOTE — ED POST DISCHARGE NOTE - RESULT SUMMARY
UXC: E. Coli  50,000-99,000 CFU/ml and Streptococcus agalactiae Gp B 10,000-49,000CFU/ml No antibiotic listed in ED provider note or prescription writer at time of discharge. UA: negative message left with Call Back  P.A. number and hours for return call back.

## 2023-07-17 ENCOUNTER — APPOINTMENT (OUTPATIENT)
Dept: OBGYN | Facility: CLINIC | Age: 48
End: 2023-07-17

## 2023-08-15 ENCOUNTER — APPOINTMENT (OUTPATIENT)
Dept: ULTRASOUND IMAGING | Facility: CLINIC | Age: 48
End: 2023-08-15

## 2023-08-18 ENCOUNTER — APPOINTMENT (OUTPATIENT)
Dept: MRI IMAGING | Facility: CLINIC | Age: 48
End: 2023-08-18

## 2023-09-01 ENCOUNTER — APPOINTMENT (OUTPATIENT)
Dept: MRI IMAGING | Facility: CLINIC | Age: 48
End: 2023-09-01

## 2023-10-12 ENCOUNTER — APPOINTMENT (OUTPATIENT)
Dept: GASTROENTEROLOGY | Facility: CLINIC | Age: 48
End: 2023-10-12

## 2023-10-13 ENCOUNTER — APPOINTMENT (OUTPATIENT)
Dept: GASTROENTEROLOGY | Facility: CLINIC | Age: 48
End: 2023-10-13
Payer: COMMERCIAL

## 2023-10-13 VITALS
HEART RATE: 83 BPM | DIASTOLIC BLOOD PRESSURE: 108 MMHG | OXYGEN SATURATION: 100 % | RESPIRATION RATE: 10 BRPM | SYSTOLIC BLOOD PRESSURE: 176 MMHG

## 2023-10-13 DIAGNOSIS — Z12.11 ENCOUNTER FOR SCREENING FOR MALIGNANT NEOPLASM OF COLON: ICD-10-CM

## 2023-10-13 DIAGNOSIS — K59.00 CONSTIPATION, UNSPECIFIED: ICD-10-CM

## 2023-10-13 DIAGNOSIS — Z12.12 ENCOUNTER FOR SCREENING FOR MALIGNANT NEOPLASM OF COLON: ICD-10-CM

## 2023-10-13 DIAGNOSIS — R10.9 UNSPECIFIED ABDOMINAL PAIN: ICD-10-CM

## 2023-10-13 PROCEDURE — 45380 COLONOSCOPY AND BIOPSY: CPT

## 2023-10-13 PROCEDURE — 84703 CHORIONIC GONADOTROPIN ASSAY: CPT | Mod: 59,QW

## 2023-10-13 PROCEDURE — 43239 EGD BIOPSY SINGLE/MULTIPLE: CPT | Mod: 59

## 2023-10-13 PROCEDURE — 99212 OFFICE O/P EST SF 10 MIN: CPT | Mod: 25

## 2023-11-02 ENCOUNTER — NON-APPOINTMENT (OUTPATIENT)
Age: 48
End: 2023-11-02

## 2023-11-02 DIAGNOSIS — K29.60 OTHER GASTRITIS W/OUT BLEEDING: ICD-10-CM

## 2023-11-03 PROBLEM — K29.60 OTHER SPECIFIED GASTRITIS: Status: ACTIVE | Noted: 2023-11-03

## 2023-11-03 RX ORDER — FAMOTIDINE 20 MG/1
20 TABLET, FILM COATED ORAL
Qty: 180 | Refills: 1 | Status: ACTIVE | COMMUNITY
Start: 2023-11-03 | End: 1900-01-01

## 2024-03-06 ENCOUNTER — APPOINTMENT (OUTPATIENT)
Dept: GASTROENTEROLOGY | Facility: CLINIC | Age: 49
End: 2024-03-06

## 2024-12-07 ENCOUNTER — NON-APPOINTMENT (OUTPATIENT)
Age: 49
End: 2024-12-07